# Patient Record
Sex: FEMALE | Race: WHITE | NOT HISPANIC OR LATINO | URBAN - METROPOLITAN AREA
[De-identification: names, ages, dates, MRNs, and addresses within clinical notes are randomized per-mention and may not be internally consistent; named-entity substitution may affect disease eponyms.]

---

## 2024-04-17 ENCOUNTER — APPOINTMENT (RX ONLY)
Dept: URBAN - METROPOLITAN AREA CLINIC 91 | Facility: CLINIC | Age: 59
Setting detail: DERMATOLOGY
End: 2024-04-17

## 2024-04-17 DIAGNOSIS — L57.8 OTHER SKIN CHANGES DUE TO CHRONIC EXPOSURE TO NONIONIZING RADIATION: ICD-10-CM

## 2024-04-17 DIAGNOSIS — D18.0 HEMANGIOMA: ICD-10-CM

## 2024-04-17 DIAGNOSIS — L82.1 OTHER SEBORRHEIC KERATOSIS: ICD-10-CM

## 2024-04-17 DIAGNOSIS — Z71.89 OTHER SPECIFIED COUNSELING: ICD-10-CM

## 2024-04-17 PROBLEM — D23.39 OTHER BENIGN NEOPLASM OF SKIN OF OTHER PARTS OF FACE: Status: ACTIVE | Noted: 2024-04-17

## 2024-04-17 PROBLEM — D18.01 HEMANGIOMA OF SKIN AND SUBCUTANEOUS TISSUE: Status: ACTIVE | Noted: 2024-04-17

## 2024-04-17 PROCEDURE — ? SUNSCREEN RECOMMENDATIONS

## 2024-04-17 PROCEDURE — ? COUNSELING

## 2024-04-17 PROCEDURE — 99203 OFFICE O/P NEW LOW 30 MIN: CPT

## 2024-04-17 ASSESSMENT — LOCATION DETAILED DESCRIPTION DERM
LOCATION DETAILED: LEFT PROXIMAL DORSAL FOREARM
LOCATION DETAILED: LEFT SUPERIOR UPPER BACK
LOCATION DETAILED: RIGHT PROXIMAL DORSAL FOREARM
LOCATION DETAILED: RIGHT INFERIOR MEDIAL MIDBACK
LOCATION DETAILED: RIGHT POSTERIOR SHOULDER
LOCATION DETAILED: RIGHT ANTERIOR DISTAL THIGH
LOCATION DETAILED: LEFT LATERAL CANTHUS

## 2024-04-17 ASSESSMENT — LOCATION ZONE DERM
LOCATION ZONE: LEG
LOCATION ZONE: TRUNK
LOCATION ZONE: EYELID
LOCATION ZONE: ARM

## 2024-04-17 ASSESSMENT — LOCATION SIMPLE DESCRIPTION DERM
LOCATION SIMPLE: RIGHT FOREARM
LOCATION SIMPLE: LEFT FOREARM
LOCATION SIMPLE: LEFT EYELID
LOCATION SIMPLE: RIGHT THIGH
LOCATION SIMPLE: LEFT UPPER BACK
LOCATION SIMPLE: RIGHT LOWER BACK
LOCATION SIMPLE: RIGHT SHOULDER

## 2024-05-07 ENCOUNTER — HOSPITAL ENCOUNTER (OUTPATIENT)
Dept: RADIOLOGY | Facility: EXTERNAL LOCATION | Age: 59
Discharge: HOME | End: 2024-05-07
Payer: COMMERCIAL

## 2024-05-16 ENCOUNTER — TELEPHONE (OUTPATIENT)
Dept: CARDIOLOGY | Facility: HOSPITAL | Age: 59
End: 2024-05-16
Payer: COMMERCIAL

## 2024-05-16 DIAGNOSIS — I77.3 ARTERIAL FIBROMUSCULAR DYSPLASIA (CMS-HCC): ICD-10-CM

## 2024-05-16 DIAGNOSIS — I65.23 BILATERAL CAROTID ARTERY STENOSIS: ICD-10-CM

## 2024-05-16 PROBLEM — D32.0 INTRACRANIAL MENINGIOMA (MULTI): Status: ACTIVE | Noted: 2022-11-16

## 2024-05-16 PROBLEM — B36.9 DERMAL MYCOSIS: Status: ACTIVE | Noted: 2023-02-24

## 2024-05-16 PROBLEM — E53.8 VITAMIN B12 DEFICIENCY (NON ANEMIC): Status: ACTIVE | Noted: 2023-08-02

## 2024-05-16 PROBLEM — Z86.73 HISTORY OF TRANSIENT ISCHEMIC ATTACK: Status: ACTIVE | Noted: 2022-11-16

## 2024-05-16 PROBLEM — E78.5 HYPERLIPIDEMIA: Status: ACTIVE | Noted: 2022-11-16

## 2024-05-16 PROBLEM — E72.11 HYPERHOMOCYSTEINEMIA (MULTI): Status: ACTIVE | Noted: 2023-11-08

## 2024-05-16 PROBLEM — E05.00 GRAVES' DISEASE: Status: ACTIVE | Noted: 2022-11-16

## 2024-05-16 PROBLEM — I38 HEART VALVE DISORDER: Status: ACTIVE | Noted: 2018-03-06

## 2024-05-16 RX ORDER — NAPROXEN SODIUM 220 MG/1
1 TABLET, FILM COATED ORAL DAILY
COMMUNITY

## 2024-05-16 RX ORDER — ICOSAPENT ETHYL 1 G/1
CAPSULE ORAL
COMMUNITY
End: 2024-06-04 | Stop reason: WASHOUT

## 2024-05-16 RX ORDER — PROPRANOLOL HYDROCHLORIDE 10 MG/1
1 TABLET ORAL NIGHTLY PRN
COMMUNITY

## 2024-05-16 RX ORDER — ATORVASTATIN CALCIUM 10 MG/1
1 TABLET, FILM COATED ORAL DAILY
COMMUNITY

## 2024-05-16 NOTE — TELEPHONE ENCOUNTER
Spoke to pt about new FMD appt and answered her questions.  Asked her to call us back with any further questions or concerns.

## 2024-06-04 ENCOUNTER — HOSPITAL ENCOUNTER (OUTPATIENT)
Dept: VASCULAR MEDICINE | Facility: HOSPITAL | Age: 59
Discharge: HOME | End: 2024-06-04
Payer: COMMERCIAL

## 2024-06-04 ENCOUNTER — OFFICE VISIT (OUTPATIENT)
Dept: CARDIOLOGY | Facility: HOSPITAL | Age: 59
End: 2024-06-04
Payer: COMMERCIAL

## 2024-06-04 VITALS
SYSTOLIC BLOOD PRESSURE: 140 MMHG | HEIGHT: 63 IN | OXYGEN SATURATION: 100 % | WEIGHT: 128 LBS | BODY MASS INDEX: 22.68 KG/M2 | DIASTOLIC BLOOD PRESSURE: 70 MMHG | HEART RATE: 58 BPM

## 2024-06-04 DIAGNOSIS — I77.3 ARTERIAL FIBROMUSCULAR DYSPLASIA (CMS-HCC): ICD-10-CM

## 2024-06-04 DIAGNOSIS — Z86.73 HISTORY OF TRANSIENT ISCHEMIC ATTACK: ICD-10-CM

## 2024-06-04 DIAGNOSIS — E05.00 GRAVES' DISEASE: ICD-10-CM

## 2024-06-04 DIAGNOSIS — I67.1 UNRUPTURED CEREBRAL ANEURYSM (HHS-HCC): ICD-10-CM

## 2024-06-04 DIAGNOSIS — I65.23 BILATERAL CAROTID ARTERY STENOSIS: ICD-10-CM

## 2024-06-04 DIAGNOSIS — Z86.79 HISTORY OF CAROTID ARTERY DISSECTION: Primary | ICD-10-CM

## 2024-06-04 PROCEDURE — 93880 EXTRACRANIAL BILAT STUDY: CPT

## 2024-06-04 PROCEDURE — 93975 VASCULAR STUDY: CPT | Performed by: SURGERY

## 2024-06-04 PROCEDURE — 93975 VASCULAR STUDY: CPT

## 2024-06-04 PROCEDURE — 99205 OFFICE O/P NEW HI 60 MIN: CPT | Performed by: INTERNAL MEDICINE

## 2024-06-04 PROCEDURE — 99215 OFFICE O/P EST HI 40 MIN: CPT | Performed by: INTERNAL MEDICINE

## 2024-06-04 PROCEDURE — 93880 EXTRACRANIAL BILAT STUDY: CPT | Performed by: SURGERY

## 2024-06-04 ASSESSMENT — PAIN SCALES - GENERAL: PAINLEVEL: 0-NO PAIN

## 2024-06-04 NOTE — PROGRESS NOTES
"06/04/24  12:29 PM    Vascular Medicine - FMD/Arterial Dissection Program    This terrific 58 y.o. woman is here today with her  for another opinion regarding her diagnosis of FMD.    She was previously quite health aside from a dx of Grave's disease with hx of palpitations.  She has 3 adult children.  She works in the mental health field.    On ~11.11.2022 while listening on the telephone, taking notes she developed difficulty writing names. Her right arm also felt heavy and right wrist twitched. She went to local ER.  CT head with no hemorrhage and MRI brain with no stroke. She was started on aspirin and statin Rx for presumed TIA and referred to be evaluated by a neurologist as well as her PCP.    ~12.13.2022 she had another episode of wrist twitching with associated dizziness. MRA head/neck was done (see below) and she was started on Keppra for possible seizures.    She has undergone extensive further imaging with MRA head demonstrating bilateral ICA FMD and concern for IC aneurysms.  She subsequently underwent a catheter angiogram 6.5.2023 which confirmed dx of ICA FMD and likely occult ICA dissections and defined the ~ 1-1.5 cm left ICA intracranial portion aneurysm (and 2 small infundibula).    Subsequent imaging (see below) has also found right EIA FMD (and I think right brachial and possible right renal FMD).    Ms. Morales has few symptoms.  She had pulsatile tinnitus rarely years ago.  She has some minor headaches.  No neck pain but some neck \"clicking\"/ \"crunching\" with movement.    No known MI. NO hx of renal infarction or CKD.  No mesenteric ischemia or infarction or unintentional weight loss or post prandial angina.  No classical limb claudication; has some MSK leg weakness and is working on physical therapy and uses the treadmill.    She take propranolol for Graves/ tachycardia. She has had some high BP reading in doctors' offices.    She has not had recurrent neurological sx for ~ 1 1/2 years " "at this point.      Past Medical History:   Diagnosis Date    Fibromuscular dysplasia (CMS-HCC)     Graves disease     History of carotid artery dissection     Meningioma (Multi)     TIA (transient ischemic attack)     Unruptured cerebral aneurysm (HHS-HCC)     1mm bleb seen on cerebral angiogram     Past Surgical History:   Procedure Laterality Date    KNEE SURGERY      TONSILLECTOMY      WISDOM TOOTH EXTRACTION         Current Outpatient Medications   Medication Sig Dispense Refill    aspirin 81 mg chewable tablet Chew 1 tablet (81 mg) once daily.      atorvastatin (Lipitor) 10 mg tablet Take 1 tablet (10 mg) by mouth once daily.      propranolol (Inderal) 10 mg tablet Take 1 tablet (10 mg) by mouth as needed at bedtime.       No current facility-administered medications for this visit.     Allergies   Allergen Reactions    Ciprofloxacin Other and Unknown    Codeine Other and Unknown    Diclofenac Other and Unknown    Iodine Other     Tobacco Use: Low Risk  (6/4/2024)    Patient History     Smoking Tobacco Use: Never     Smokeless Tobacco Use: Never     Passive Exposure: Not on file     Family hx + for her father dying suddenly in setting of a known AAA (? Rupture). Her mother may have had a stroke.    On examination:  /70 (BP Location: Right arm)   Pulse 58   Ht 1.6 m (5' 3\")   Wt 58.1 kg (128 lb)   SpO2 100%   BMI 22.67 kg/m²   HEENT no Suzy's, normal uvula  Neck supple; + right cervical bruit  +S1, S2, RRR; ;no m/r/g  Clear lungs  Abd soft, benign, no abdominal bruits  +Right femoral bruit  NO brachial bruits  Brisk UE/LE pulses except left PT which was hard to localize    Extensive scans reviewed personally  12.13.2023  MRA head/neck   12.13.2023 carotid duplex (report only)  1.25.2023 MRA head/neck  6.5.2023 catheter angiogram  11.15.2023 MRA head/neck  2.8.2024 MRA abdomen    Summary of findings  Bilateral ICA multifocal FMD  Right ICA is patulous, ? Chronic dissection flap  Left ICA has area of " more distinct PSA and appearance of chronic dissection  Both ICAs likely dissected at some point with PSA/ectatic remodeling    Right ICA      Left ICA      Right vert has beading seen on catheter angiogram; cannot r/o beading right/left vert on prior MRA imaging      Small supraclinoid left ICA aneurysm ~ 1-1.5 mm seen on various imaging studies and stable. Infundibula reported in right PCOM and right anterior choroidal arteries (felt not to be aneurysms)    Right brachial artery multifocal FMD seen on catheter angiogram (radial access shot); not reported    Right EIA area of beading + tubular narrowing    Renal arteries likely normal on MRA but cannot r/o subtle right renal abnormality    Today's renal duplex  Slight turbulence of flow, velocity shift right renal artery   cm/sec, 2.3  Right kidney 10.6 cm vs. Left 11.3 cm  Left renal velocities normal, slight turbulence    Today's carotid duplex  Turbulence, beading out of proportion to elevated velocities  Right ICA  cm/sec  Left ICA  cm/sec  Left ICA PSA seen    Recent Lipids 5.22.2024  T Chol 160 mg/dL  HDL 91 mg/dL  LDD 55 mg/dL  TG 71 mg/dL    Cr 0.67  Anti PL antibodies assessed and negative  Assessment/Plan:  Terrific 58 y.o. woman here for another opinion regarding her dx of FMD and its management. Her initial presentation was with a presumed TIA.    To summarize, I think she has: multifocal, multivessel FMD with bilateral ICA and right vertebral, right brachial, right EIA Beading. She may have very subtle right renal artery abnormality.  She has stigmata of having suffered bilateral ICA dissections at some point with ectasia/PSA bilaterally (cervical). She has a small supraclinoid ICA aneurysm (left ~ 1-1.5 mm).  Aside from her neurological events in late 2022, she has been asymptomatic while on aspirin Rx.  On my review of her imaging, I think all of her lesions can be monitored, perhaps less frequently than has been done in past.   Her IC aneurysm is very small. She will need aortic monitoring over time given her family hx of possible ruptured AAA in her father.    We touched on the following plans:    I agree with her current medical regimen of aspirin and atorvastatin. I see no indication for Vascepa given the lipid profile above. FMD and aneurysms/dissections are a non-atherosclerotic process.    Could repeat MRA head/neck in fall, 2023 for surveillance of small IC aneurysm and carotid ectasias.  At some point, could potentially move to duplex us surveillance followed by MRA every other year.  Discussed how her aneurysm is very small, needs periodic monitor, but no indication for intervention at this time. In terms of rest of vasculature, I'd recommend a renal duplex in 1 year.    I don't think her brachial/EIA FMD are causing sx; if any exertional leg sx develop, could obtain exercise ZORAIDA; if not will monitor clinically on pulse exam.    At some point, would obtain thoracic aortic imaging; could start with Echo; would do full thoracic aorta with repeat below neck imaging again at 4-5 year laura from last testing (done in 2.2023).  CTA may have highest resolution to assess renal and EIAs.  She will need longitudinal periodic aortic assessment given her FMD + family hx.    Need to keep BP < 130/80 mm Hg all times.    Overall, I am hopeful she will continue to well/remain stable from the FMD perspective.    She had excellent questions regarding FMD and its natural hx, her disease state, imaging protocol, her exercise restrictions (discussed no lifting > 35# and making some modifications to other exercise).  She consented to enroll in the [place] [employer] Registry for FMD.     It was a pleasure to meet Ms. Morales and her  today    >75 minutes spent on today's visit including review of extensive outside imaging and records.      Seda Pink MD  Co-Director, Vascular Center  [facility] [place], [facility]    [facility] Master Clinician in Fibromuscular Dysplasia and Vascular Care  Professor of Medicine  [facility]

## 2024-06-04 NOTE — PATIENT INSTRUCTIONS
Nice to meet you today Ms. Morales.  Continue one Aspirin 81mg once daily.  Okay to stop Vascepa.   No lifting over 35 lbs at any given time.  No roller coasters or chiropractic adjustments.  No isometric exercises such as full planks and full push ups.  Avoid usage of fluoroquinolone antibiotics such as Levaquin and Ciprofloxacin.     If you decide to return to [place], recommend MRA head/neck and renal ultrasound in one year.     Seda Pink MD  Co-Director, Vascular Center  [facility] [place], [facility]   [facility] Evangel[facility] Family Master Clinician in Fibromuscular Dysplasia and Vascular Care  Professor of Medicine  [facility]

## 2024-06-04 NOTE — LETTER
June 5, 2024     Ijeoma Rosado MD  [facility]  [place] [address]    Patient: Imain Morales   YOB: 1965   Date of Visit: 6/4/2024       Dear Dr. Ijeoma Rosado MD:    Thank you for referring Imani Morales to me for evaluation. Below are my notes for this consultation.  If you have questions, please do not hesitate to call me. I look forward to following your patient along with you.       Sincerely,     Seda Pink MD      CC: No Recipients  ______________________________________________________________________________________    06/04/24  12:29 PM    Vascular Medicine - FMD/Arterial Dissection Program    This terrific 58 y.o. woman is here today with her  for another opinion regarding her diagnosis of FMD.    She was previously quite health aside from a dx of Grave's disease with hx of palpitations.  She has 3 adult children.  She works in the mental health field.    On ~11.11.2022 while listening on the telephone, taking notes she developed difficulty writing names. Her right arm also felt heavy and right wrist twitched. She went to local ER.  CT head with no hemorrhage and MRI brain with no stroke. She was started on aspirin and statin Rx for presumed TIA and referred to be evaluated by a neurologist as well as her PCP.    ~12.13.2022 she had another episode of wrist twitching with associated dizziness. MRA head/neck was done (see below) and she was started on Keppra for possible seizures.    She has undergone extensive further imaging with MRA head demonstrating bilateral ICA FMD and concern for IC aneurysms.  She subsequently underwent a catheter angiogram 6.5.2023 which confirmed dx of ICA FMD and likely occult ICA dissections and defined the ~ 1-1.5 cm left ICA intracranial portion aneurysm (and 2 small infundibula).    Subsequent imaging (see below) has also found right EIA FMD (and I think right brachial and possible right renal FMD).    Ms. Morales has  "few symptoms.  She had pulsatile tinnitus rarely years ago.  She has some minor headaches.  No neck pain but some neck \"clicking\"/ \"crunching\" with movement.    No known MI. NO hx of renal infarction or CKD.  No mesenteric ischemia or infarction or unintentional weight loss or post prandial angina.  No classical limb claudication; has some MSK leg weakness and is working on physical therapy and uses the treadmill.    She take propranolol for Graves/ tachycardia. She has had some high BP reading in doctors' offices.    She has not had recurrent neurological sx for ~ 1 1/2 years at this point.      Past Medical History:   Diagnosis Date   • Fibromuscular dysplasia (CMS-HCC)    • Graves disease    • History of carotid artery dissection    • Meningioma (Multi)    • TIA (transient ischemic attack)    • Unruptured cerebral aneurysm (HHS-HCC)     1mm bleb seen on cerebral angiogram     Past Surgical History:   Procedure Laterality Date   • KNEE SURGERY     • TONSILLECTOMY     • WISDOM TOOTH EXTRACTION         Current Outpatient Medications   Medication Sig Dispense Refill   • aspirin 81 mg chewable tablet Chew 1 tablet (81 mg) once daily.     • atorvastatin (Lipitor) 10 mg tablet Take 1 tablet (10 mg) by mouth once daily.     • propranolol (Inderal) 10 mg tablet Take 1 tablet (10 mg) by mouth as needed at bedtime.       No current facility-administered medications for this visit.     Allergies   Allergen Reactions   • Ciprofloxacin Other and Unknown   • Codeine Other and Unknown   • Diclofenac Other and Unknown   • Iodine Other     Tobacco Use: Low Risk  (6/4/2024)    Patient History    • Smoking Tobacco Use: Never    • Smokeless Tobacco Use: Never    • Passive Exposure: Not on file     Family hx + for her father dying suddenly in setting of a known AAA (? Rupture). Her mother may have had a stroke.    On examination:  /70 (BP Location: Right arm)   Pulse 58   Ht 1.6 m (5' 3\")   Wt 58.1 kg (128 lb)   SpO2 100%   " BMI 22.67 kg/m²   HEENT no Suzy's, normal uvula  Neck supple; + right cervical bruit  +S1, S2, RRR; ;no m/r/g  Clear lungs  Abd soft, benign, no abdominal bruits  +Right femoral bruit  NO brachial bruits  Brisk UE/LE pulses except left PT which was hard to localize    Extensive scans reviewed personally  12.13.2023  MRA head/neck   12.13.2023 carotid duplex (report only)  1.25.2023 MRA head/neck  6.5.2023 catheter angiogram  11.15.2023 MRA head/neck  2.8.2024 MRA abdomen    Summary of findings  Bilateral ICA multifocal FMD  Right ICA is patulous, ? Chronic dissection flap  Left ICA has area of more distinct PSA and appearance of chronic dissection  Both ICAs likely dissected at some point with PSA/ectatic remodeling    Right ICA      Left ICA      Right vert has beading seen on catheter angiogram; cannot r/o beading right/left vert on prior MRA imaging      Small supraclinoid left ICA aneurysm ~ 1-1.5 mm seen on various imaging studies and stable. Infundibula reported in right PCOM and right anterior choroidal arteries (felt not to be aneurysms)    Right brachial artery multifocal FMD seen on catheter angiogram (radial access shot); not reported    Right EIA area of beading + tubular narrowing    Renal arteries likely normal on MRA but cannot r/o subtle right renal abnormality    Today's renal duplex  Slight turbulence of flow, velocity shift right renal artery   cm/sec, 2.3  Right kidney 10.6 cm vs. Left 11.3 cm  Left renal velocities normal, slight turbulence    Today's carotid duplex  Turbulence, beading out of proportion to elevated velocities  Right ICA  cm/sec  Left ICA  cm/sec  Left ICA PSA seen    Recent Lipids 5.22.2024  T Chol 160 mg/dL  HDL 91 mg/dL  LDD 55 mg/dL  TG 71 mg/dL    Cr 0.67  Anti PL antibodies assessed and negative  Assessment/Plan:  Terrific 58 y.o. woman here for another opinion regarding her dx of FMD and its management. Her initial presentation was with a presumed  TIA.    To summarize, I think she has: multifocal, multivessel FMD with bilateral ICA and right vertebral, right brachial, right EIA Beading. She may have very subtle right renal artery abnormality.  She has stigmata of having suffered bilateral ICA dissections at some point with ectasia/PSA bilaterally (cervical). She has a small supraclinoid ICA aneurysm (left ~ 1-1.5 mm).  Aside from her neurological events in late 2022, she has been asymptomatic while on aspirin Rx.  On my review of her imaging, I think all of her lesions can be monitored, perhaps less frequently than has been done in past.  Her IC aneurysm is very small. She will need aortic monitoring over time given her family hx of possible ruptured AAA in her father.    We touched on the following plans:    I agree with her current medical regimen of aspirin and atorvastatin. I see no indication for Vascepa given the lipid profile above. FMD and aneurysms/dissections are a non-atherosclerotic process.    Could repeat MRA head/neck in fall, 2023 for surveillance of small IC aneurysm and carotid ectasias.  At some point, could potentially move to duplex us surveillance followed by MRA every other year.  Discussed how her aneurysm is very small, needs periodic monitor, but no indication for intervention at this time. In terms of rest of vasculature, I'd recommend a renal duplex in 1 year.    I don't think her brachial/EIA FMD are causing sx; if any exertional leg sx develop, could obtain exercise ZORAIDA; if not will monitor clinically on pulse exam.    At some point, would obtain thoracic aortic imaging; could start with Echo; would do full thoracic aorta with repeat below neck imaging again at 4-5 year laura from last testing (done in 2.2023).  CTA may have highest resolution to assess renal and EIAs.  She will need longitudinal periodic aortic assessment given her FMD + family hx.    Need to keep BP < 130/80 mm Hg all times.    Overall, I am hopeful she will  continue to well/remain stable from the FMD perspective.    She had excellent questions regarding FMD and its natural hx, her disease state, imaging protocol, her exercise restrictions (discussed no lifting > 35# and making some modifications to other exercise).  She consented to enroll in the [place] [employer] Registry for FMD.     It was a pleasure to meet Ms. Morales and her  today    >75 minutes spent on today's visit including review of extensive outside imaging and records.      Seda Pink MD  Co-Director, Vascular Center  [facility] [place], [facility]   [facility]ene Evangelista Family Master Clinician in Fibromuscular Dysplasia and Vascular Care  Professor of Medicine  [facility]

## 2024-06-05 ENCOUNTER — HOSPITAL ENCOUNTER (OUTPATIENT)
Dept: RADIOLOGY | Facility: EXTERNAL LOCATION | Age: 59
Discharge: HOME | End: 2024-06-05
Payer: COMMERCIAL

## 2024-06-05 PROBLEM — I72.0 CAROTID PSEUDOANEURYSM (CMS-HCC): Status: ACTIVE | Noted: 2024-06-05

## 2024-09-12 ENCOUNTER — PATIENT MESSAGE (OUTPATIENT)
Dept: CARDIOLOGY | Facility: HOSPITAL | Age: 59
End: 2024-09-12

## 2024-12-30 ENCOUNTER — PATIENT MESSAGE (OUTPATIENT)
Dept: CARDIOLOGY | Facility: HOSPITAL | Age: 59
End: 2024-12-30

## 2025-03-28 DIAGNOSIS — I77.3 ARTERIAL FIBROMUSCULAR DYSPLASIA (CMS-HCC): ICD-10-CM

## 2025-08-28 ENCOUNTER — HOSPITAL ENCOUNTER (OUTPATIENT)
Dept: RADIOLOGY | Facility: EXTERNAL LOCATION | Age: 60
Discharge: HOME | End: 2025-08-28

## 2025-09-02 ENCOUNTER — OFFICE VISIT (OUTPATIENT)
Dept: CARDIOLOGY | Facility: HOSPITAL | Age: 60
End: 2025-09-02
Payer: COMMERCIAL

## 2025-09-02 ENCOUNTER — HOSPITAL ENCOUNTER (OUTPATIENT)
Dept: VASCULAR MEDICINE | Facility: HOSPITAL | Age: 60
Discharge: HOME | End: 2025-09-02
Payer: COMMERCIAL

## 2025-09-02 VITALS
SYSTOLIC BLOOD PRESSURE: 125 MMHG | OXYGEN SATURATION: 98 % | HEART RATE: 60 BPM | BODY MASS INDEX: 24.27 KG/M2 | DIASTOLIC BLOOD PRESSURE: 69 MMHG | WEIGHT: 137 LBS

## 2025-09-02 DIAGNOSIS — I77.3 ARTERIAL FIBROMUSCULAR DYSPLASIA: ICD-10-CM

## 2025-09-02 DIAGNOSIS — I72.0 CAROTID PSEUDOANEURYSM: ICD-10-CM

## 2025-09-02 DIAGNOSIS — Z86.79 HISTORY OF CAROTID ARTERY DISSECTION: ICD-10-CM

## 2025-09-02 DIAGNOSIS — I67.1 UNRUPTURED CEREBRAL ANEURYSM (HHS-HCC): ICD-10-CM

## 2025-09-02 DIAGNOSIS — D32.0 INTRACRANIAL MENINGIOMA (MULTI): ICD-10-CM

## 2025-09-02 PROCEDURE — 99213 OFFICE O/P EST LOW 20 MIN: CPT | Mod: 25 | Performed by: INTERNAL MEDICINE

## 2025-09-02 PROCEDURE — 99214 OFFICE O/P EST MOD 30 MIN: CPT | Performed by: INTERNAL MEDICINE

## 2025-09-02 PROCEDURE — 93975 VASCULAR STUDY: CPT | Performed by: SURGERY

## 2025-09-02 PROCEDURE — 93975 VASCULAR STUDY: CPT

## 2025-09-02 PROCEDURE — 1036F TOBACCO NON-USER: CPT | Performed by: INTERNAL MEDICINE

## 2025-09-02 RX ORDER — LANOLIN ALCOHOL/MO/W.PET/CERES
1000 CREAM (GRAM) TOPICAL DAILY
COMMUNITY

## 2025-09-02 RX ORDER — CHOLECALCIFEROL (VITAMIN D3) 25 MCG
25 TABLET ORAL DAILY
COMMUNITY
End: 2025-09-02 | Stop reason: WASHOUT